# Patient Record
Sex: MALE | Race: WHITE
[De-identification: names, ages, dates, MRNs, and addresses within clinical notes are randomized per-mention and may not be internally consistent; named-entity substitution may affect disease eponyms.]

---

## 2022-05-17 LAB
ALBUMIN SERPL BCP-MCNC: 4.4 G/DL (ref 3.4–5)
ALBUMIN/GLOB SERPL: 1.2 {RATIO} (ref 1.1–1.5)
ALP SERPL-CCNC: 66 IU/L (ref 46–116)
ALT SERPL W P-5'-P-CCNC: 35 U/L (ref 30–65)
ANION GAP SERPL CALCULATED.3IONS-SCNC: 10 MMOL/L (ref 8–16)
AST SERPL W P-5'-P-CCNC: 25 U/L (ref 10–37)
BASOPHILS # BLD AUTO: 0.1 X10'3 (ref 0–0.2)
BASOPHILS NFR BLD AUTO: 0.9 % (ref 0–1)
BILIRUB SERPL-MCNC: 1 MG/DL (ref 0–1)
BUN SERPL-MCNC: 24 MG/DL (ref 7–18)
BUN/CREAT SERPL: 19.4 (ref 5.4–32)
CALCIUM SERPL-MCNC: 9.5 MG/DL (ref 8.5–10.1)
CHLORIDE SERPL-SCNC: 106 MMOL/L (ref 99–107)
CO2 SERPL-SCNC: 26.1 MMOL/L (ref 24–32)
CREAT SERPL-MCNC: 1.24 MG/DL (ref 0.6–1.1)
EOSINOPHIL # BLD AUTO: 0.2 X10'3 (ref 0–0.9)
EOSINOPHIL NFR BLD AUTO: 3.1 % (ref 0–6)
ERYTHROCYTE [DISTWIDTH] IN BLOOD BY AUTOMATED COUNT: 14.8 % (ref 11.5–14.5)
GFR SERPL CREATININE-BSD FRML MDRD: 57 ML/MIN
GLUCOSE SERPL-MCNC: 114 MG/DL (ref 70–104)
HCT VFR BLD AUTO: 46.7 % (ref 42–52)
HGB BLD-MCNC: 15.7 G/DL (ref 14–17.9)
LYMPHOCYTES # BLD AUTO: 2.3 X10'3 (ref 1.1–4.8)
LYMPHOCYTES NFR BLD AUTO: 28.5 % (ref 21–51)
MCH RBC QN AUTO: 31.1 PG (ref 27–31)
MCHC RBC AUTO-ENTMCNC: 33.6 G/DL (ref 33–36.5)
MCV RBC AUTO: 92.7 FL (ref 78–98)
MONOCYTES # BLD AUTO: 0.8 X10'3 (ref 0–0.9)
MONOCYTES NFR BLD AUTO: 10.5 % (ref 2–12)
NEUTROPHILS # BLD AUTO: 4.6 X10'3 (ref 1.8–7.7)
NEUTROPHILS NFR BLD AUTO: 57 % (ref 42–75)
PLATELET # BLD AUTO: 287 X10'3 (ref 140–440)
PMV BLD AUTO: 7.8 FL (ref 7.4–10.4)
POTASSIUM SERPL-SCNC: 3.8 MMOL/L (ref 3.4–5.1)
PROT SERPL-MCNC: 8 G/DL (ref 6.4–8.2)
RBC # BLD AUTO: 5.03 X10'6 (ref 4.7–6.1)
SODIUM SERPL-SCNC: 142 MMOL/L (ref 135–145)

## 2022-05-23 ENCOUNTER — HOSPITAL ENCOUNTER (OUTPATIENT)
Dept: HOSPITAL 94 - PAS IN | Age: 77
Discharge: HOME | End: 2022-05-23
Attending: ORTHOPAEDIC SURGERY
Payer: OTHER GOVERNMENT

## 2022-05-23 VITALS — DIASTOLIC BLOOD PRESSURE: 65 MMHG | SYSTOLIC BLOOD PRESSURE: 121 MMHG

## 2022-05-23 VITALS — DIASTOLIC BLOOD PRESSURE: 68 MMHG | SYSTOLIC BLOOD PRESSURE: 124 MMHG

## 2022-05-23 VITALS — SYSTOLIC BLOOD PRESSURE: 122 MMHG | DIASTOLIC BLOOD PRESSURE: 64 MMHG

## 2022-05-23 VITALS — SYSTOLIC BLOOD PRESSURE: 124 MMHG | DIASTOLIC BLOOD PRESSURE: 62 MMHG

## 2022-05-23 VITALS — SYSTOLIC BLOOD PRESSURE: 123 MMHG | DIASTOLIC BLOOD PRESSURE: 66 MMHG

## 2022-05-23 VITALS — SYSTOLIC BLOOD PRESSURE: 116 MMHG | DIASTOLIC BLOOD PRESSURE: 53 MMHG

## 2022-05-23 VITALS — SYSTOLIC BLOOD PRESSURE: 134 MMHG | DIASTOLIC BLOOD PRESSURE: 78 MMHG

## 2022-05-23 VITALS — SYSTOLIC BLOOD PRESSURE: 112 MMHG | DIASTOLIC BLOOD PRESSURE: 75 MMHG

## 2022-05-23 VITALS — SYSTOLIC BLOOD PRESSURE: 112 MMHG | DIASTOLIC BLOOD PRESSURE: 65 MMHG

## 2022-05-23 VITALS — SYSTOLIC BLOOD PRESSURE: 120 MMHG | DIASTOLIC BLOOD PRESSURE: 60 MMHG

## 2022-05-23 VITALS — DIASTOLIC BLOOD PRESSURE: 70 MMHG | SYSTOLIC BLOOD PRESSURE: 115 MMHG

## 2022-05-23 VITALS — SYSTOLIC BLOOD PRESSURE: 119 MMHG | DIASTOLIC BLOOD PRESSURE: 72 MMHG

## 2022-05-23 VITALS — DIASTOLIC BLOOD PRESSURE: 69 MMHG | SYSTOLIC BLOOD PRESSURE: 125 MMHG

## 2022-05-23 VITALS — DIASTOLIC BLOOD PRESSURE: 77 MMHG | SYSTOLIC BLOOD PRESSURE: 136 MMHG

## 2022-05-23 VITALS — SYSTOLIC BLOOD PRESSURE: 115 MMHG | DIASTOLIC BLOOD PRESSURE: 67 MMHG

## 2022-05-23 VITALS — WEIGHT: 223.11 LBS | HEIGHT: 68 IN | BODY MASS INDEX: 33.81 KG/M2

## 2022-05-23 VITALS — DIASTOLIC BLOOD PRESSURE: 65 MMHG | SYSTOLIC BLOOD PRESSURE: 114 MMHG

## 2022-05-23 DIAGNOSIS — Z72.89: ICD-10-CM

## 2022-05-23 DIAGNOSIS — E78.5: ICD-10-CM

## 2022-05-23 DIAGNOSIS — I12.9: ICD-10-CM

## 2022-05-23 DIAGNOSIS — M19.011: Primary | ICD-10-CM

## 2022-05-23 DIAGNOSIS — Z87.891: ICD-10-CM

## 2022-05-23 DIAGNOSIS — Z98.890: ICD-10-CM

## 2022-05-23 DIAGNOSIS — N18.30: ICD-10-CM

## 2022-05-23 DIAGNOSIS — M75.21: ICD-10-CM

## 2022-05-23 DIAGNOSIS — E66.9: ICD-10-CM

## 2022-05-23 DIAGNOSIS — Z79.899: ICD-10-CM

## 2022-05-23 DIAGNOSIS — G89.18: ICD-10-CM

## 2022-05-23 DIAGNOSIS — Z96.612: ICD-10-CM

## 2022-05-23 PROCEDURE — 36415 COLL VENOUS BLD VENIPUNCTURE: CPT

## 2022-05-23 PROCEDURE — 23430 REPAIR BICEPS TENDON: CPT

## 2022-05-23 PROCEDURE — 73020 X-RAY EXAM OF SHOULDER: CPT

## 2022-05-23 PROCEDURE — 76942 ECHO GUIDE FOR BIOPSY: CPT

## 2022-05-23 PROCEDURE — 97530 THERAPEUTIC ACTIVITIES: CPT

## 2022-05-23 PROCEDURE — 97161 PT EVAL LOW COMPLEX 20 MIN: CPT

## 2022-05-23 PROCEDURE — 85025 COMPLETE CBC W/AUTO DIFF WBC: CPT

## 2022-05-23 PROCEDURE — 87081 CULTURE SCREEN ONLY: CPT

## 2022-05-23 PROCEDURE — 80053 COMPREHEN METABOLIC PANEL: CPT

## 2022-05-23 PROCEDURE — 23472 RECONSTRUCT SHOULDER JOINT: CPT

## 2022-05-23 PROCEDURE — 64416 NJX AA&/STRD BRCH PL NFS IMG: CPT

## 2022-05-23 PROCEDURE — 82948 REAGENT STRIP/BLOOD GLUCOSE: CPT

## 2022-05-23 NOTE — NUR
PT GIVEN INSTRUCTION ON THE USE OF THE INCENTIVE SPIROMETRY, PT STATED HE USED IT LAST TIME 
AND VERBALIZED UNDERSTANDING THAT IT WAS VERY IMPORTANT FOR HIS LUNGS.  PT ALSO EDUCATED ON 
THE ON-Q PUMP THAT HE WILL RECEIVED AND USE AFTER SURGERY.  HE SAID HE USED IT ALSO FOR HIS 
LAST SHOULDER SURGERY.  [facility]

## 2022-05-23 NOTE — NUR
Received from OR via , accompanied by Anesthesiologist DR CUADRA and report given by 
Anesthesiolgist. AWAKENS TO VOICE. VITALS STABLE. DRESSING DI. JOVANNA PAIN. RUE IN A SIMPLE 
SLING.

## 2022-05-23 NOTE — NUR
Report called to receiving nurse. Transferred via [facility] . 

Special Issues communicated to receiving nurse.AWAKE AND ORIENTED. VITALS STABLE. DRESSING 
DI, JOVANNA PAIN. TO ORTHO RM [id number] AT THIS TIME.

## 2022-05-23 NOTE — NUR
PT PREPPED FOR TOTAL SHOULDER REPLACEMENT, IV STARTED WITHOUT DIFFICULTY IN LEFT HAND.  PT 
STATED HE USED THE HIBICLENS SOAP TO SHOWER FOR 5 DAYS,  HE RECEIVED THE INFO PACKET BUT DID 
NOT FEEL THE NEED TO READ IT AS HE JUST HAD HIS LEFT SHOULDER DONE RECENTLY.  BILATERAL 
READIAL PULSES PRESENT AND EQUAL.  RIGHT ARM IS WARM WITH BRISK CAPILLARY REFILL.  SENSATION 
IS INTACT.